# Patient Record
Sex: FEMALE | Race: OTHER | Employment: UNEMPLOYED | URBAN - METROPOLITAN AREA
[De-identification: names, ages, dates, MRNs, and addresses within clinical notes are randomized per-mention and may not be internally consistent; named-entity substitution may affect disease eponyms.]

---

## 2022-03-01 ENCOUNTER — HOSPITAL ENCOUNTER (EMERGENCY)
Age: 72
Discharge: HOME | End: 2022-03-01
Attending: EMERGENCY MEDICINE
Payer: MEDICARE

## 2022-03-01 ENCOUNTER — APPOINTMENT (OUTPATIENT)
Dept: GENERAL RADIOLOGY | Age: 72
End: 2022-03-01
Attending: EMERGENCY MEDICINE
Payer: MEDICARE

## 2022-03-01 VITALS
DIASTOLIC BLOOD PRESSURE: 90 MMHG | SYSTOLIC BLOOD PRESSURE: 160 MMHG | TEMPERATURE: 97.6 F | WEIGHT: 154 LBS | BODY MASS INDEX: 24.75 KG/M2 | HEART RATE: 84 BPM | RESPIRATION RATE: 10 BRPM | HEIGHT: 66 IN | OXYGEN SATURATION: 99 %

## 2022-03-01 DIAGNOSIS — I10 PRIMARY HYPERTENSION: Primary | ICD-10-CM

## 2022-03-01 LAB
ALBUMIN SERPL-MCNC: 3.9 G/DL (ref 3.4–5)
ALBUMIN/GLOB SERPL: 1.1 {RATIO} (ref 0.8–1.7)
ALP SERPL-CCNC: 111 U/L (ref 45–117)
ALT SERPL-CCNC: 32 U/L (ref 13–56)
ANION GAP SERPL CALC-SCNC: 6 MMOL/L (ref 3–18)
AST SERPL-CCNC: 26 U/L (ref 10–38)
ATRIAL RATE: 101 BPM
BASOPHILS # BLD: 0.1 K/UL (ref 0–0.1)
BASOPHILS NFR BLD: 1 % (ref 0–2)
BILIRUB SERPL-MCNC: 0.6 MG/DL (ref 0.2–1)
BUN SERPL-MCNC: 12 MG/DL (ref 7–18)
BUN/CREAT SERPL: 15 (ref 12–20)
CALCIUM SERPL-MCNC: 9.5 MG/DL (ref 8.5–10.1)
CALCULATED P AXIS, ECG09: 106 DEGREES
CALCULATED R AXIS, ECG10: 85 DEGREES
CALCULATED T AXIS, ECG11: -110 DEGREES
CHLORIDE SERPL-SCNC: 104 MMOL/L (ref 100–111)
CO2 SERPL-SCNC: 28 MMOL/L (ref 21–32)
CREAT SERPL-MCNC: 0.79 MG/DL (ref 0.6–1.3)
DIAGNOSIS, 93000: NORMAL
DIFFERENTIAL METHOD BLD: ABNORMAL
EOSINOPHIL # BLD: 0 K/UL (ref 0–0.4)
EOSINOPHIL NFR BLD: 0 % (ref 0–5)
ERYTHROCYTE [DISTWIDTH] IN BLOOD BY AUTOMATED COUNT: 12.8 % (ref 11.6–14.5)
GLOBULIN SER CALC-MCNC: 3.4 G/DL (ref 2–4)
GLUCOSE SERPL-MCNC: 201 MG/DL (ref 74–99)
HCT VFR BLD AUTO: 44 % (ref 35–45)
HGB BLD-MCNC: 14.5 G/DL (ref 12–16)
IMM GRANULOCYTES # BLD AUTO: 0.1 K/UL (ref 0–0.04)
IMM GRANULOCYTES NFR BLD AUTO: 1 % (ref 0–0.5)
LYMPHOCYTES # BLD: 2 K/UL (ref 0.9–3.6)
LYMPHOCYTES NFR BLD: 25 % (ref 21–52)
MCH RBC QN AUTO: 30 PG (ref 24–34)
MCHC RBC AUTO-ENTMCNC: 33 G/DL (ref 31–37)
MCV RBC AUTO: 91.1 FL (ref 78–100)
MONOCYTES # BLD: 0.6 K/UL (ref 0.05–1.2)
MONOCYTES NFR BLD: 7 % (ref 3–10)
NEUTS SEG # BLD: 5.2 K/UL (ref 1.8–8)
NEUTS SEG NFR BLD: 66 % (ref 40–73)
NRBC # BLD: 0 K/UL (ref 0–0.01)
NRBC BLD-RTO: 0 PER 100 WBC
P-R INTERVAL, ECG05: 126 MS
PLATELET # BLD AUTO: 182 K/UL (ref 135–420)
PMV BLD AUTO: 11.3 FL (ref 9.2–11.8)
POTASSIUM SERPL-SCNC: 4 MMOL/L (ref 3.5–5.5)
PROT SERPL-MCNC: 7.3 G/DL (ref 6.4–8.2)
Q-T INTERVAL, ECG07: 350 MS
QRS DURATION, ECG06: 86 MS
QTC CALCULATION (BEZET), ECG08: 453 MS
RBC # BLD AUTO: 4.83 M/UL (ref 4.2–5.3)
SODIUM SERPL-SCNC: 138 MMOL/L (ref 136–145)
TROPONIN-HIGH SENSITIVITY: 9 NG/L (ref 0–54)
VENTRICULAR RATE, ECG03: 101 BPM
WBC # BLD AUTO: 7.9 K/UL (ref 4.6–13.2)

## 2022-03-01 PROCEDURE — 71045 X-RAY EXAM CHEST 1 VIEW: CPT

## 2022-03-01 PROCEDURE — 85025 COMPLETE CBC W/AUTO DIFF WBC: CPT

## 2022-03-01 PROCEDURE — 99285 EMERGENCY DEPT VISIT HI MDM: CPT

## 2022-03-01 PROCEDURE — 84484 ASSAY OF TROPONIN QUANT: CPT

## 2022-03-01 PROCEDURE — 93005 ELECTROCARDIOGRAM TRACING: CPT

## 2022-03-01 PROCEDURE — 80053 COMPREHEN METABOLIC PANEL: CPT

## 2022-03-01 NOTE — ED TRIAGE NOTES
Patient does not speak english. Had to use  [facility]. Patient reports she does not feel good. Her b/p is high she feels weak and when she eats she feels nauseated. This has been going for about 3 months.   After triaged patient she states she is having chest pain

## 2022-03-02 NOTE — ED PROVIDER NOTES
EMERGENCY DEPARTMENT HISTORY AND PHYSICAL EXAM    Date: 3/1/2022  Patient Name: Robert Collazo    History of Presenting Illness     Chief Complaint   Patient presents with    Chest Pain    Lethargy    Hypertension         History Provided By: Patient and Patient's Daughter    Additional History (Context):   8:15 PM  Robert Collazo is a 73 y.o. female with PMHX of hypertension, previous heart surgery, hypertension who presents to the emergency department C/O not feeling well. She has a previous history of high blood pressure and heart disease and heart surgery who has been having symptoms including over the last 3 months. Her daughter states she recently moved here from [place] in order to have better care of her health problems. Patient details with me through Estonian and her daughter assisting in translating she has burning sensation to her chest.  She also has palpitations type of discomfort which she describes as an intense heartbeat but not a racing sensation. She denies any nausea vomiting diarrhea she has a dry cough with occasional bouts of thick productive sputum. There is no hemoptysis. She has no headache. There is no syncopal presyncopal episodes. She has no urinary symptoms. She has called and reasons for appointment with a primary care doctor however these are pushed out until April. Social History  No smoking drinking or drugs no significant caffeine use. Family History  Positive for high blood pressure negative for premature heart disease. Negative for malignancy or bleeding disorders. PCP: None        Past History     Past Medical History:  Past Medical History:   Diagnosis Date    Arthritis     Asthma     Endocrine disease     Hypertension        Past Surgical History:  Past Surgical History:   Procedure Laterality Date    HX CORONARY ARTERY BYPASS GRAFT         Family History:  No family history on file.     Social History:  Social History Tobacco Use    Smoking status: Never Smoker    Smokeless tobacco: Not on file   Substance Use Topics    Alcohol use: Never    Drug use: Never       Allergies:  No Known Allergies      Review of Systems   Review of Systems   Constitutional: Negative. HENT: Negative. Eyes: Negative. Respiratory: Negative. Cardiovascular: Positive for chest pain (Burning sensation in the chest and throat) and palpitations. Endocrine: Negative. Genitourinary: Negative. Musculoskeletal: Negative. Skin: Negative. Allergic/Immunologic: Negative. Neurological: Negative. Hematological: Negative. Psychiatric/Behavioral: The patient is nervous/anxious. All other systems reviewed and are negative. Physical Exam     Vitals:    03/01/22 1042 03/01/22 1130   BP: (!) 197/92 (!) 160/90   Pulse: (!) 102 84   Resp: 16 10   Temp: 97.6 °F (36.4 °C)    SpO2: 100% 99%   Weight: 69.9 kg (154 lb)    Height: 5' 6\" (1.676 m)      Physical Exam  Vitals and nursing note reviewed. Constitutional:       General: She is not in acute distress. Appearance: She is well-developed. She is not diaphoretic. HENT:      Head: Normocephalic and atraumatic. Eyes:      General: No scleral icterus. Extraocular Movements:      Right eye: Normal extraocular motion. Left eye: Normal extraocular motion. Conjunctiva/sclera: Conjunctivae normal.      Pupils: Pupils are equal, round, and reactive to light. Neck:      Trachea: No tracheal deviation. Cardiovascular:      Rate and Rhythm: Normal rate and regular rhythm. Heart sounds: Normal heart sounds. Pulmonary:      Effort: Pulmonary effort is normal. No respiratory distress. Breath sounds: Normal breath sounds. No stridor. Abdominal:      General: Bowel sounds are normal. There is no distension. Palpations: Abdomen is soft. Tenderness: There is no abdominal tenderness. There is no rebound.    Musculoskeletal:         General: No tenderness. Normal range of motion. Cervical back: Normal range of motion and neck supple. Comments: Grossly unremarkable without abnormalities   Skin:     General: Skin is warm and dry. Capillary Refill: Capillary refill takes less than 2 seconds. Findings: No erythema or rash. Neurological:      Mental Status: She is alert and oriented to person, place, and time. GCS: GCS eye subscore is 4. GCS verbal subscore is 5. GCS motor subscore is 6. Cranial Nerves: No cranial nerve deficit. Motor: Motor function is intact. No weakness. Coordination: Coordination normal.   Psychiatric:         Mood and Affect: Mood normal.         Behavior: Behavior normal.         Thought Content:  Thought content normal.         Judgment: Judgment normal.       Diagnostic Study Results     Labs -  Recent Results (from the past 24 hour(s))   EKG, 12 LEAD, INITIAL    Collection Time: 03/01/22 10:46 AM   Result Value Ref Range    Ventricular Rate 101 BPM    Atrial Rate 101 BPM    P-R Interval 126 ms    QRS Duration 86 ms    Q-T Interval 350 ms    QTC Calculation (Bezet) 453 ms    Calculated P Axis 106 degrees    Calculated R Axis 85 degrees    Calculated T Axis -110 degrees    Diagnosis       Sinus tachycardia  ST & T wave abnormality, consider inferior ischemia  ST & T wave abnormality, consider anterolateral ischemia  Abnormal ECG  No previous ECGs available     CBC WITH AUTOMATED DIFF    Collection Time: 03/01/22 11:00 AM   Result Value Ref Range    WBC 7.9 4.6 - 13.2 K/uL    RBC 4.83 4.20 - 5.30 M/uL    HGB 14.5 12.0 - 16.0 g/dL    HCT 44.0 35.0 - 45.0 %    MCV 91.1 78.0 - 100.0 FL    MCH 30.0 24.0 - 34.0 PG    MCHC 33.0 31.0 - 37.0 g/dL    RDW 12.8 11.6 - 14.5 %    PLATELET 589 197 - 441 K/uL    MPV 11.3 9.2 - 11.8 FL    NRBC 0.0 0  WBC    ABSOLUTE NRBC 0.00 0.00 - 0.01 K/uL    NEUTROPHILS 66 40 - 73 %    LYMPHOCYTES 25 21 - 52 %    MONOCYTES 7 3 - 10 %    EOSINOPHILS 0 0 - 5 % BASOPHILS 1 0 - 2 %    IMMATURE GRANULOCYTES 1 (H) 0.0 - 0.5 %    ABS. NEUTROPHILS 5.2 1.8 - 8.0 K/UL    ABS. LYMPHOCYTES 2.0 0.9 - 3.6 K/UL    ABS. MONOCYTES 0.6 0.05 - 1.2 K/UL    ABS. EOSINOPHILS 0.0 0.0 - 0.4 K/UL    ABS. BASOPHILS 0.1 0.0 - 0.1 K/UL    ABS. IMM. GRANS. 0.1 (H) 0.00 - 0.04 K/UL    DF AUTOMATED     METABOLIC PANEL, COMPREHENSIVE    Collection Time: 03/01/22 11:00 AM   Result Value Ref Range    Sodium 138 136 - 145 mmol/L    Potassium 4.0 3.5 - 5.5 mmol/L    Chloride 104 100 - 111 mmol/L    CO2 28 21 - 32 mmol/L    Anion gap 6 3.0 - 18 mmol/L    Glucose 201 (H) 74 - 99 mg/dL    BUN 12 7.0 - 18 MG/DL    Creatinine 0.79 0.6 - 1.3 MG/DL    BUN/Creatinine ratio 15 12 - 20      GFR est AA >60 >60 ml/min/1.73m2    GFR est non-AA >60 >60 ml/min/1.73m2    Calcium 9.5 8.5 - 10.1 MG/DL    Bilirubin, total 0.6 0.2 - 1.0 MG/DL    ALT (SGPT) 32 13 - 56 U/L    AST (SGOT) 26 10 - 38 U/L    Alk. phosphatase 111 45 - 117 U/L    Protein, total 7.3 6.4 - 8.2 g/dL    Albumin 3.9 3.4 - 5.0 g/dL    Globulin 3.4 2.0 - 4.0 g/dL    A-G Ratio 1.1 0.8 - 1.7     TROPONIN-HIGH SENSITIVITY    Collection Time: 03/01/22 11:00 AM   Result Value Ref Range    Troponin-High Sensitivity 9 0 - 54 ng/L        Radiologic Studies -   XR CHEST PORT   Final Result      No acute cardiopulmonary process. CT Results  (Last 48 hours)    None        CXR Results  (Last 48 hours)               03/01/22 1107  XR CHEST PORT Final result    Impression:      No acute cardiopulmonary process. Narrative:  EXAM: One view chest x-ray       CLINICAL INDICATION/HISTORY: Chest pain. COMPARISON: No prior relevant study available for comparison. TECHNIQUE: Single AP view of the chest was obtained.       _______________       FINDINGS:       HEART, VESSELS, MEDIASTINUM: Heart size is normal. No vascular congestion. LUNGS, PLEURAL SPACES: The lungs are clear. No effusion or pneumothorax.        BONY THORAX, SOFT TISSUES: Unremarkable.       _______________                 Medications given in the ED-  Medications - No data to display      Medical Decision Making   I am the first provider for this patient. I reviewed the vital signs, available nursing notes, past medical history, past surgical history, family history and social history. Vital Signs-Reviewed the patient's vital signs. Pulse Oximetry Analysis - 99% on room air    Cardiac Monitor:  Rate: 86 bpm  Rhythm: Sinus rhythm    EKG interpretation: (Preliminary)  10:46 AM   Borderline sinus tachycardia, rate 101, nonspecific ST changes  EKG read by Alejandra Vivas MD      Records Reviewed: NURSING NOTES AND PREVIOUS MEDICAL RECORDS    Provider Notes (Medical Decision Making):   Patient describes palpitations and burning sensation in her throat taking place every 3 weeks. She is a high-risk patient and we really have no accurate history of her medications. They can acknowledge that she takes losartan thyroid medications but that is all. Differentials include hypertensive crisis urgency reflux ACS pulmonary embolism metabolic disorders muscle aches and spasms generalized fatigue. Possible but unlikely pneumonia or CHF. If her results are unremarkable she can be discharged follow-up seen with primary care doctor. Procedures:  Procedures    ED Course:   10:25 AM: Initial assessment performed. The patients presenting problems have been discussed, and they are in agreement with the care plan formulated and outlined with them. I have encouraged them to ask questions as they arise throughout their visit. Diagnosis and Disposition       DISCHARGE NOTE:    Fredrick Garcia's  results have been reviewed with her. She has been counseled regarding her diagnosis, treatment, and plan. She verbally conveys understanding and agreement of the signs, symptoms, diagnosis, treatment and prognosis and additionally agrees to follow up as discussed.   She also agrees with the care-plan and conveys that all of her questions have been answered. I have also provided discharge instructions for her that include: educational information regarding their diagnosis and treatment, and list of reasons why they would want to return to the ED prior to their follow-up appointment, should her condition change. She has been provided with education for proper emergency department utilization. CLINICAL IMPRESSION:    1. Primary hypertension        PLAN:  1. D/C Home  2. There are no discharge medications for this patient. 3.   Follow-up Information     Follow up With Specialties Details Why Contact Info    [facility] EMERGENCY DEPT Emergency Medicine  As needed [address] [place] [id number]  [address] [facility]  [phone]        _______________________________    This note was partially transcribed via voice recognition software. Although efforts have been made to catch any discrepancies, it may contain sound alike words, grammatical errors, or nonsensical words.

## 2022-05-06 ENCOUNTER — HOSPITAL ENCOUNTER (OUTPATIENT)
Dept: PREADMISSION TESTING | Age: 72
Discharge: HOME | End: 2022-05-06

## 2022-05-06 VITALS — HEIGHT: 66 IN | BODY MASS INDEX: 25.39 KG/M2 | WEIGHT: 158 LBS

## 2022-05-06 RX ORDER — ROSUVASTATIN CALCIUM 10 MG/1
10 TABLET, COATED ORAL DAILY
COMMUNITY

## 2022-05-06 RX ORDER — OFLOXACIN 3 MG/ML
1 SOLUTION/ DROPS OPHTHALMIC
Status: CANCELLED | OUTPATIENT
Start: 2022-05-10

## 2022-05-06 RX ORDER — LEVOTHYROXINE SODIUM 75 UG/1
75 TABLET ORAL
COMMUNITY

## 2022-05-06 RX ORDER — PHENYLEPHRINE HYDROCHLORIDE 25 MG/ML
1 SOLUTION/ DROPS OPHTHALMIC
Status: CANCELLED | OUTPATIENT
Start: 2022-05-10 | End: 2022-05-10

## 2022-05-06 RX ORDER — SODIUM CHLORIDE, SODIUM LACTATE, POTASSIUM CHLORIDE, CALCIUM CHLORIDE 600; 310; 30; 20 MG/100ML; MG/100ML; MG/100ML; MG/100ML
75 INJECTION, SOLUTION INTRAVENOUS CONTINUOUS
Status: CANCELLED | OUTPATIENT
Start: 2022-05-10

## 2022-05-06 RX ORDER — KETOROLAC TROMETHAMINE 5 MG/ML
1 SOLUTION OPHTHALMIC
Status: CANCELLED | OUTPATIENT
Start: 2022-05-10

## 2022-05-06 RX ORDER — DULAGLUTIDE 1.5 MG/.5ML
1.5 INJECTION, SOLUTION SUBCUTANEOUS
COMMUNITY

## 2022-05-06 RX ORDER — TROPICAMIDE 10 MG/ML
1 SOLUTION/ DROPS OPHTHALMIC
Status: CANCELLED | OUTPATIENT
Start: 2022-05-10 | End: 2022-05-10

## 2022-05-06 NOTE — PERIOP NOTES
Patient speaks only [place] She was interviewed with the help of an , Artie Albarran, [id number]. Patient advised to wear mask when entering any of the buildings. Being fully vaccinated, they will no longer need to be tested or quarantine prior to procedure. Patient does not meet criteria for special pop. No hx of sleep apnea or previous screening. Denies hx of MH. PCP is aware of surgery. CHG skin care kit given and process reviewed. Not participating in any research study or clinical trials. Patient instructed when coming in for surgery, do not use any lotions, creams or deodorant. Also to remove all jewelry, hairpins, make-up and nail polish. Instructed to wear something loose fitting and comfortable, easy to take off, easy to put on. Instructed to bring bag with glasses and all eye gtts with her dos. No code status ordered.

## 2022-05-10 ENCOUNTER — ANESTHESIA EVENT (OUTPATIENT)
Dept: SURGERY | Age: 72
End: 2022-05-10
Payer: MEDICARE

## 2022-05-10 ENCOUNTER — ANESTHESIA (OUTPATIENT)
Dept: SURGERY | Age: 72
End: 2022-05-10
Payer: MEDICARE

## 2022-05-10 ENCOUNTER — HOSPITAL ENCOUNTER (OUTPATIENT)
Age: 72
Setting detail: OUTPATIENT SURGERY
Discharge: HOME | End: 2022-05-10
Attending: OPHTHALMOLOGY | Admitting: OPHTHALMOLOGY
Payer: MEDICARE

## 2022-05-10 VITALS
OXYGEN SATURATION: 99 % | HEIGHT: 66 IN | RESPIRATION RATE: 16 BRPM | DIASTOLIC BLOOD PRESSURE: 68 MMHG | TEMPERATURE: 97 F | WEIGHT: 157.6 LBS | SYSTOLIC BLOOD PRESSURE: 140 MMHG | BODY MASS INDEX: 25.33 KG/M2 | HEART RATE: 70 BPM

## 2022-05-10 PROBLEM — H53.8 BLURRED VISION: Status: ACTIVE | Noted: 2022-05-10

## 2022-05-10 PROBLEM — H53.8 BLURRED VISION: Status: RESOLVED | Noted: 2022-05-10 | Resolved: 2022-05-10

## 2022-05-10 LAB — GLUCOSE BLD STRIP.AUTO-MCNC: 112 MG/DL (ref 70–110)

## 2022-05-10 PROCEDURE — 74011000250 HC RX REV CODE- 250: Performed by: OPHTHALMOLOGY

## 2022-05-10 PROCEDURE — 74011250636 HC RX REV CODE- 250/636: Performed by: OPHTHALMOLOGY

## 2022-05-10 PROCEDURE — 77030013340: Performed by: OPHTHALMOLOGY

## 2022-05-10 PROCEDURE — 77030013389: Performed by: OPHTHALMOLOGY

## 2022-05-10 PROCEDURE — 76060000032 HC ANESTHESIA 0.5 TO 1 HR: Performed by: OPHTHALMOLOGY

## 2022-05-10 PROCEDURE — 74011250636 HC RX REV CODE- 250/636: Performed by: NURSE ANESTHETIST, CERTIFIED REGISTERED

## 2022-05-10 PROCEDURE — 76210000021 HC REC RM PH II 0.5 TO 1 HR: Performed by: OPHTHALMOLOGY

## 2022-05-10 PROCEDURE — 76010000138 HC OR TIME 0.5 TO 1 HR: Performed by: OPHTHALMOLOGY

## 2022-05-10 PROCEDURE — 77030006704 HC BLD OPHTH SLT ALCN -B: Performed by: OPHTHALMOLOGY

## 2022-05-10 PROCEDURE — 77030020782 HC GWN BAIR PAWS FLX 3M -B: Performed by: OPHTHALMOLOGY

## 2022-05-10 PROCEDURE — 77030018837 HC SOL IRR OPTH ALCN -A: Performed by: OPHTHALMOLOGY

## 2022-05-10 PROCEDURE — 82962 GLUCOSE BLOOD TEST: CPT

## 2022-05-10 PROCEDURE — 2709999900 HC NON-CHARGEABLE SUPPLY: Performed by: OPHTHALMOLOGY

## 2022-05-10 PROCEDURE — V2632 POST CHMBR INTRAOCULAR LENS: HCPCS | Performed by: OPHTHALMOLOGY

## 2022-05-10 DEVICE — ACRYSOF(R) IQ ASPHERIC NATURAL IOL, SINGLE-PIECE ACRYLIC FOLDABLE PCL, UV WITH BLUE LIGHTFILTER, 13.0MM LENGTH, 6.0MM ANTERIORASYMMETRIC BICONVEX OPTIC, PLANAR HAPTICS.
Type: IMPLANTABLE DEVICE | Site: EYE | Status: FUNCTIONAL
Brand: ACRYSOF®

## 2022-05-10 RX ORDER — EPINEPHRINE 1 MG/ML
INJECTION, SOLUTION, CONCENTRATE INTRAVENOUS AS NEEDED
Status: DISCONTINUED | OUTPATIENT
Start: 2022-05-10 | End: 2022-05-10 | Stop reason: HOSPADM

## 2022-05-10 RX ORDER — SODIUM CHLORIDE, SODIUM LACTATE, POTASSIUM CHLORIDE, CALCIUM CHLORIDE 600; 310; 30; 20 MG/100ML; MG/100ML; MG/100ML; MG/100ML
75 INJECTION, SOLUTION INTRAVENOUS CONTINUOUS
Status: DISCONTINUED | OUTPATIENT
Start: 2022-05-10 | End: 2022-05-10 | Stop reason: HOSPADM

## 2022-05-10 RX ORDER — OFLOXACIN 3 MG/ML
1 SOLUTION/ DROPS OPHTHALMIC
Status: DISCONTINUED | OUTPATIENT
Start: 2022-05-10 | End: 2022-05-10 | Stop reason: HOSPADM

## 2022-05-10 RX ORDER — PHENYLEPHRINE HYDROCHLORIDE 25 MG/ML
1 SOLUTION/ DROPS OPHTHALMIC
Status: COMPLETED | OUTPATIENT
Start: 2022-05-10 | End: 2022-05-10

## 2022-05-10 RX ORDER — TROPICAMIDE 10 MG/ML
1 SOLUTION/ DROPS OPHTHALMIC
Status: COMPLETED | OUTPATIENT
Start: 2022-05-10 | End: 2022-05-10

## 2022-05-10 RX ORDER — MIDAZOLAM HYDROCHLORIDE 1 MG/ML
INJECTION, SOLUTION INTRAMUSCULAR; INTRAVENOUS AS NEEDED
Status: DISCONTINUED | OUTPATIENT
Start: 2022-05-10 | End: 2022-05-10 | Stop reason: HOSPADM

## 2022-05-10 RX ORDER — FENTANYL CITRATE 50 UG/ML
INJECTION, SOLUTION INTRAMUSCULAR; INTRAVENOUS AS NEEDED
Status: DISCONTINUED | OUTPATIENT
Start: 2022-05-10 | End: 2022-05-10 | Stop reason: HOSPADM

## 2022-05-10 RX ORDER — KETOROLAC TROMETHAMINE 5 MG/ML
1 SOLUTION OPHTHALMIC
Status: COMPLETED | OUTPATIENT
Start: 2022-05-10 | End: 2022-05-10

## 2022-05-10 RX ADMIN — TROPICAMIDE 1 DROP: 10 SOLUTION/ DROPS OPHTHALMIC at 08:05

## 2022-05-10 RX ADMIN — FENTANYL CITRATE 25 MCG: 50 INJECTION, SOLUTION INTRAMUSCULAR; INTRAVENOUS at 09:21

## 2022-05-10 RX ADMIN — PHENYLEPHRINE HYDROCHLORIDE 1 DROP: 2.5 SOLUTION/ DROPS OPHTHALMIC at 08:16

## 2022-05-10 RX ADMIN — TROPICAMIDE 1 DROP: 10 SOLUTION/ DROPS OPHTHALMIC at 08:16

## 2022-05-10 RX ADMIN — KETOROLAC TROMETHAMINE 1 DROP: 0.5 SOLUTION OPHTHALMIC at 08:01

## 2022-05-10 RX ADMIN — LIDOCAINE HYDROCHLORIDE 2 DROP: 35 GEL OPHTHALMIC at 08:16

## 2022-05-10 RX ADMIN — MIDAZOLAM 1 MG: 1 INJECTION INTRAMUSCULAR; INTRAVENOUS at 09:11

## 2022-05-10 RX ADMIN — PHENYLEPHRINE HYDROCHLORIDE 1 DROP: 2.5 SOLUTION/ DROPS OPHTHALMIC at 08:05

## 2022-05-10 RX ADMIN — FENTANYL CITRATE 25 MCG: 50 INJECTION, SOLUTION INTRAMUSCULAR; INTRAVENOUS at 09:28

## 2022-05-10 RX ADMIN — MIDAZOLAM 1 MG: 1 INJECTION INTRAMUSCULAR; INTRAVENOUS at 09:05

## 2022-05-10 RX ADMIN — KETOROLAC TROMETHAMINE 1 DROP: 0.5 SOLUTION OPHTHALMIC at 08:05

## 2022-05-10 RX ADMIN — TROPICAMIDE 1 DROP: 10 SOLUTION/ DROPS OPHTHALMIC at 08:11

## 2022-05-10 RX ADMIN — SODIUM CHLORIDE, SODIUM LACTATE, POTASSIUM CHLORIDE, AND CALCIUM CHLORIDE 75 ML/HR: 600; 310; 30; 20 INJECTION, SOLUTION INTRAVENOUS at 08:14

## 2022-05-10 RX ADMIN — FENTANYL CITRATE 25 MCG: 50 INJECTION, SOLUTION INTRAMUSCULAR; INTRAVENOUS at 09:15

## 2022-05-10 RX ADMIN — FENTANYL CITRATE 25 MCG: 50 INJECTION, SOLUTION INTRAMUSCULAR; INTRAVENOUS at 09:09

## 2022-05-10 RX ADMIN — PHENYLEPHRINE HYDROCHLORIDE 1 DROP: 2.5 SOLUTION/ DROPS OPHTHALMIC at 08:01

## 2022-05-10 RX ADMIN — TROPICAMIDE 1 DROP: 10 SOLUTION/ DROPS OPHTHALMIC at 08:01

## 2022-05-10 RX ADMIN — OFLOXACIN 1 DROP: 3 SOLUTION OPHTHALMIC at 08:00

## 2022-05-10 RX ADMIN — KETOROLAC TROMETHAMINE 1 DROP: 0.5 SOLUTION OPHTHALMIC at 08:16

## 2022-05-10 RX ADMIN — KETOROLAC TROMETHAMINE 1 DROP: 0.5 SOLUTION OPHTHALMIC at 08:11

## 2022-05-10 RX ADMIN — PHENYLEPHRINE HYDROCHLORIDE 1 DROP: 2.5 SOLUTION/ DROPS OPHTHALMIC at 08:11

## 2022-05-10 NOTE — ANESTHESIA POSTPROCEDURE EVALUATION
Procedure(s):  CATARACT EXTRACTION WITH INTRA OCULAR LENS IMPLANT- RIGHT EYE. No value filed. <BSHSIANPOST>    INITIAL Post-op Vital signs:   Vitals Value Taken Time   /78 05/10/22 1015   Temp 36.1 °C (97 °F) 05/10/22 1000   Pulse 70 05/10/22 1015   Resp 17 05/10/22 1015   SpO2 95 % 05/10/22 1015       Post-Anesthesia Evaluation and Assessment    Cardiovascular Function/Vital Signs  Visit Vitals  /78 (BP 1 Location: Left upper arm, BP Patient Position: At rest)   Pulse 70   Temp 36.1 °C (97 °F)   Resp 17   Ht 5' 6\" (1.676 m)   Wt 71.5 kg (157 lb 9.6 oz)   SpO2 95%   BMI 25.44 kg/m²       Patient is status post Procedure(s):  CATARACT EXTRACTION WITH INTRA OCULAR LENS IMPLANT- RIGHT EYE. Nausea/Vomiting: Controlled. Postoperative hydration reviewed and adequate. Pain:  Pain Scale 1: Numeric (0 - 10) (05/10/22 1015)  Pain Intensity 1: 0 (05/10/22 1015)   Managed. Neurological Status: At baseline. Mental Status and Level of Consciousness: Baseline and appropriate for discharge. Pulmonary Status:   O2 Device: None (Room air) (05/10/22 1015)   Adequate oxygenation and airway patent. Complications related to anesthesia: None    Post-anesthesia assessment completed. No concerns. Patient has met all discharge requirements.     Signed By: Alok Hi CRNA    May 10, 2022

## 2022-05-10 NOTE — PERIOP NOTES
Face to face Discharged  instruction given to daughter/pt  and agreed with the plan. Discharged includes diet, activity limitations , medication to continue - as directed by Doctor Tom Rahman  and f/u appointment- to bring all her eyedrops tomorrow. D/c to home in stable condition with care of family.

## 2022-05-10 NOTE — INTERVAL H&P NOTE
Update History & Physical    The Patient's History and Physical of May 10,   2022 was reviewed with the patient and I examined the patient. There was no change. The surgical site was confirmed by the patient and me. Plan:  The risk, benefits, expected outcome, and alternative to the recommended procedure have been discussed with the patient. Patient understands and wants to proceed with the procedure.     Electronically signed by Swapnil Ibanez MD on 5/10/2022 at 8:16 AM

## 2022-05-10 NOTE — DISCHARGE INSTRUCTIONS
DISCHARGE SUMMARY from Nurse    PATIENT INSTRUCTIONS:    After general anesthesia or intravenous sedation, for 24 hours or while taking prescription Narcotics:  · Limit your activities  · Do not drive and operate hazardous machinery  · Do not make important personal or business decisions  · Do  not drink alcoholic beverages  · If you have not urinated within 8 hours after discharge, please contact your surgeon on call. Report the following to your surgeon:  · Excessive pain, swelling, redness or odor of or around the surgical area  · Temperature over 100.5  · Nausea and vomiting lasting longer than 4 hours or if unable to take medications  · Any signs of decreased circulation or nerve impairment to extremity: change in color, persistent  numbness, tingling, coldness or increase pain  · Any questions    What to do at Home:  Recommended activity: as above ,    If you experience any of the following symptoms as above , please follow up with Doctor Edward To. *  Please give a list of your current medications to your Primary Care Provider. *  Please update this list whenever your medications are discontinued, doses are      changed, or new medications (including over-the-counter products) are added. *  Please carry medication information at all times in case of emergency situations. These are general instructions for a healthy lifestyle:    No smoking/ No tobacco products/ Avoid exposure to second hand smoke  Surgeon General's Warning:  Quitting smoking now greatly reduces serious risk to your health.     Obesity, smoking, and sedentary lifestyle greatly increases your risk for illness    A healthy diet, regular physical exercise & weight monitoring are important for maintaining a healthy lifestyle    You may be retaining fluid if you have a history of heart failure or if you experience any of the following symptoms:  Weight gain of 3 pounds or more overnight or 5 pounds in a week, increased swelling in our hands or feet or shortness of breath while lying flat in bed. Please call your doctor as soon as you notice any of these symptoms; do not wait until your next office visit. The discharge information has been reviewed with the patient and caregiver. The patient and caregiver verbalized understanding. Discharge medications reviewed with the patient and caregiver and appropriate educational materials and side effects teaching were provided. ___________________________________________________________________________________________________________________________________     [place] cataratas: Qué esperar en el hogar  Cataract Surgery: What to Expect at Home  Malone recuperación     [address] [place] [address], el dariel no le dolerá. Sin embargo, podría sentir que le pica, que está pegajoso o que [place]. También es posible que el dariel lagrimee más que lo habitual.  La mayoría de las personas alyssa mejor entre 1 y 1 días después de la [facility]. Nhan podrían pasar entre 3 y 8 semanas para que alcance todos los beneficios de la [facility] y nina lo más claramente posible. Malone médico podría enviarlo a casa con lor venda, un parche o un protector transparente en el dariel. [place] evitará que se frote el dariel. Malone médico también le dará gotas para los ojos (colirios) para ayudar a que el dariel sane. Úselas exactamente jean se le indicó. Puede leer o mirar televisión inmediatamente, nhan es posible que muchas cosas se vean borrosas. La mayoría de las personas pueden volver a malone trabajo o rutina habitual en 1 a 3 días. Después de que sane el dariel, posiblemente deba seguir usando anteojos, en especial para leer. Esta hoja de [employer] idea general del tiempo que le llevará recuperarse. Sin embargo, cada persona se recupera a un ritmo diferente. Siga los pasos que se mencionan a continuación para recuperarse lo más rápido posible. ¿Cómo puede cuidarse en el hogar? Actividad    · Descanse cuando se sienta fatigado.  Dormir lo suficiente le ayudará a recuperarse.     · Javier algunos alex, podría tener dificultad para determinar distancias. Muévase despacio, y tenga cuidado al subir y bajar escaleras y servir líquidos calientes. Pida ayuda si la necesita.     · Pregúntele a malone médico cuándo puede conducir.     · Use el vendaje, parche o protector por el tiempo que malone médico recomiende. Posiblemente solo deba usarlo mientras duerme.     · Puede ducharse o lavarse el ayla al día siguiente de la hospitals. No deje que [employer], Jayda, champú, fijador (laca) ni loción para afeitarse en el dariel, especialmente javier la primera semana.     · No se restriegue el dariel ni ejerza presión sobre él al menos javier 1 semana.     · No use maquillaje para los ojos por 1 a 2 semanas. También debería evitar el uso de cremas o lociones faciales.     · No se tiña el ayla ni se james [employer] javier 10 días después de la [facility].     · Javire 2 semanas o hasta que malone médico se lo permita, no se incline ni james ninguna actividad vigorosa, jean montar en bicicleta, trotar, levantar pesas o hacer ejercicio aeróbico.     · Javier 1 a 2 semanas, no practique natación, no se meta en un \"jacuzzi\", no james actividades de jardinería ni sacuda el polvo.     · Javier al menos 1 año después de la cirugía, use anteojos de sol los días soleados. Medicamentos    · Malone médico le dirá si puede volver a megan mitchell medicamentos y cuándo puede volver a hacerlo. También le dará indicaciones sobre cualquier medicamento nuevo que deba megan usted.     · Si bouchra aspirina o cualquier otro medicamento que previene los coágulos de [place]', pregúntele a malone médico si debería volver a tomarlo y en qué momento. Asegúrese de entender exactamente lo que malone médico quiere que james.     · Siga las indicaciones de malone médico sobre cuándo usar gotas para los ojos. Siempre lávese las nikki antes de ponerse gotas.  Para aplicarse las gotas en los ojos:  ? Incline la [facility] atrás y con un dedo baje el párpado inferior. ? Deje caer las gotas o ponga el medicamento dentro del párpado inferior. ? Cierre el dariel javier 30 a 61 segundos para permitir que las gotas o la pomada se esparzan. ? No permita que la punta de la pomada o del gotero toque mitchell pestañas ni otra superficie.     · Siga las instrucciones de malone médico para megan analgésicos (medicamentos para el dolor). La atención de seguimiento es lor parte clave de malone tratamiento y seguridad. Asegúrese de hacer y acudir a todas las citas, y llame a malone médico si está teniendo problemas. También es lor buena idea saber los resultados de mitchell exámenes y mantener lor lista de los medicamentos que bouchra. ¿Cuándo debe pedir ayuda? Llame al 911 en cualquier momento que considere que necesita atención de [place]. Por ejemplo, llame si:    · Se desmayó (perdió el conocimiento).     · Tiene lor pérdida repentina de la vista.     · Tiene dolor repentino en el pecho, le falta el aire, o tose [place]. Llame a malone médico ahora mismo o busque atención médica inmediata si:    · Tiene señales de lor infección en el dariel, tales jean:  ? Pus o lor secreción espesa que sale del dariel. ? Enrojecimiento o hinchazón alrededor del dariel. ? [place].     · Tiene nuevo o peor dolor en el dariel.     · Tiene cambios en la vista.     · Tiene síntomas de un coágulo de nadya en la pierna (conocido jean trombosis venosa profunda), tales jean:  ? Dolor en la pantorrilla, el muslo, la meryl o detrás de la rodilla. ? Enrojecimiento e hinchazón en la pierna o la meryl. Preste especial atención a los cambios en malone des y asegúrese de comunicarse con malone médico si:    · No mejora jean se esperaba. ¿Dónde puede encontrar más información en inglés? Vaya a http://www.gray.com/  Escriba R255 en la búsqueda para aprender más acerca de \"Cirugía de cataratas: Qué esperar en el hogar. \"  Revisado: 24 enero, 2022               Versión del contenido: 13.2  © 9599-8134 [employer]wise, [employer]. Las instrucciones de cuidado fueron adaptadas bajo licencia por [employer] (which disclaims liability or warranty for this information). Si usted tiene [place] afección médica o sobre estas instrucciones, siempre pregunte a malone profesional de des. Healthwise, Incorporated niega toda garantía o responsabilidad por malone uso de esta información.     Patient armband removed and shredded

## 2022-05-10 NOTE — ANESTHESIA PREPROCEDURE EVALUATION
Relevant Problems   No relevant active problems       Anesthetic History   No history of anesthetic complications            Review of Systems / Medical History  Patient summary reviewed, nursing notes reviewed and pertinent labs reviewed    Pulmonary            Asthma : well controlled  Pertinent negatives: No sleep apnea and smoker     Neuro/Psych   Within defined limits           Cardiovascular    Hypertension              Exercise tolerance: >4 METS  Comments: H/o VSD repair at 26 yo, no problems since   GI/Hepatic/Renal             Pertinent negatives: No hiatal hernia and GERD   Endo/Other    Diabetes: well controlled, type 2  Hypothyroidism: well controlled  Arthritis     Other Findings              Physical Exam    Airway  Mallampati: II  TM Distance: > 6 cm  Neck ROM: normal range of motion   Mouth opening: Normal     Cardiovascular    Rhythm: regular  Rate: normal         Dental      Comments: Missing 4 or 5 teeth   Pulmonary  Breath sounds clear to auscultation               Abdominal  GI exam deferred       Other Findings            Anesthetic Plan    ASA: 2  Anesthesia type: MAC          Induction: Intravenous  Anesthetic plan and risks discussed with: Patient

## 2022-05-10 NOTE — PERIOP NOTES
Reviewed PTA medication list with patient/caregiver and patient/caregiver denies any additional medications. Patient admits to having a responsible adult care for them at home for at least 24 hours after surgery. Patient encouraged to use gown warming system and informed that using said warming gown to regulate body temperature prior to a procedure has been shown to help reduce the risks of blood clots and infection. Patient's pharmacy of choice verified and documented in PTA medication section. Dual skin assessment & fall risk band verification completed with Naima Carrasco.

## 2022-05-24 NOTE — OP NOTES
Cataract Operative Note      Patient: Johnny Estrada               Sex: female          DOA: 5/10/2022         YOB: 1950      Age:  73 y.o.        LOS:  LOS: 0 days     Preoperative Diagnosis: Cataract right eye    Postoperative Diagnosis:  Cataract  right eye  Surgeon: Ava Calero MD, M.D. Anesthesia:  Topical anesthesia  Procedure:  Phacoemulsification of posterior chamber for intraocular lens implantation right    Fluids:  0    Procedure in Detail: The operative eye was prepped and draped in the usual fashion. A lid speculum was placed in the operative eye. A clear cornea approach was utilized. A paracentesis incision(s) was constructed with a 1 mm slit knife. One percent preservative-free lidocaine followed by viscoelastic was instilled into the anterior chamber. A clear corneal incision was made with a slit knife. A continuous curvilinear capsulorrhexis was constructed followed by hydrodissection. A phacoemulsification tip was placed into the eye, and the lens nucleus was emulsified. The irrigation/aspiration device was then used to remove any remaining cortical material.  Polishing of the capsule was performed as needed. The intraocular lens was then placed into the capsular bag after it was re-inflated with viscoelastic. The remaining viscoelastic was then removed using the irrigation/aspiration device. BSS on a cannula was then used to hydrate the wound edges. At the end of the procedure the wound was found to be watertight, the anterior chamber was deep and the pupil round. No blood loss during surgery. An antibiotic and anti-inflammatroy was placed into the operative eye. The lid speculum was removed. Protective sunglasses were then placed onto the patient. The patient was taken to the [address] [place] [address] Unit (PACU) in good condition having tolerated the procedure well. Estimated Blood Loss: 0                 Implants:   Implant Name Type Inv.  Item Serial No.  Lot No. LRB No. Used Action   LENS INTOCU 6.0 TO 30.0 DIOPT 118.7 A CONSTANT 0DEG ANG - [id number] Intraocular Lens LENS INTOCU 6.0 TO 30.0 DIOPT 118.7 A CONSTANT 0DEG ANG [id number] [employer]  Right 1 Implanted       Specimens: * No specimens in log *            Complications:  None           Clarisa Umaña MD , M.D.  [unfilled]  9:26 AM

## 2022-07-13 ENCOUNTER — HOSPITAL ENCOUNTER (OUTPATIENT)
Dept: PREADMISSION TESTING | Age: 72
Discharge: HOME | End: 2022-07-13

## 2022-07-13 VITALS — WEIGHT: 157 LBS | HEIGHT: 65 IN | BODY MASS INDEX: 26.16 KG/M2

## 2022-07-13 RX ORDER — PHENYLEPHRINE HYDROCHLORIDE 25 MG/ML
1 SOLUTION/ DROPS OPHTHALMIC
Status: CANCELLED | OUTPATIENT
Start: 2022-07-13 | End: 2022-07-13

## 2022-07-13 RX ORDER — TROPICAMIDE 10 MG/ML
1 SOLUTION/ DROPS OPHTHALMIC
Status: CANCELLED | OUTPATIENT
Start: 2022-07-26 | End: 2022-07-26

## 2022-07-13 RX ORDER — KETOROLAC TROMETHAMINE 5 MG/ML
1 SOLUTION OPHTHALMIC
Status: CANCELLED | OUTPATIENT
Start: 2022-07-26

## 2022-07-26 ENCOUNTER — HOSPITAL ENCOUNTER (OUTPATIENT)
Age: 72
Setting detail: OUTPATIENT SURGERY
Discharge: HOME | End: 2022-07-26
Attending: OPHTHALMOLOGY | Admitting: OPHTHALMOLOGY
Payer: MEDICARE

## 2022-07-26 ENCOUNTER — ANESTHESIA EVENT (OUTPATIENT)
Dept: SURGERY | Age: 72
End: 2022-07-26
Payer: MEDICARE

## 2022-07-26 ENCOUNTER — ANESTHESIA (OUTPATIENT)
Dept: SURGERY | Age: 72
End: 2022-07-26
Payer: MEDICARE

## 2022-07-26 VITALS
HEART RATE: 69 BPM | WEIGHT: 154.6 LBS | RESPIRATION RATE: 16 BRPM | TEMPERATURE: 97.9 F | BODY MASS INDEX: 24.85 KG/M2 | SYSTOLIC BLOOD PRESSURE: 128 MMHG | HEIGHT: 66 IN | OXYGEN SATURATION: 98 % | DIASTOLIC BLOOD PRESSURE: 77 MMHG

## 2022-07-26 PROBLEM — H53.8 BLURRED VISION: Status: ACTIVE | Noted: 2022-07-26

## 2022-07-26 PROBLEM — H53.8 BLURRED VISION: Status: RESOLVED | Noted: 2022-07-26 | Resolved: 2022-07-26

## 2022-07-26 LAB
GLUCOSE BLD STRIP.AUTO-MCNC: 109 MG/DL (ref 70–110)
GLUCOSE BLD STRIP.AUTO-MCNC: 72 MG/DL (ref 70–110)
GLUCOSE BLD STRIP.AUTO-MCNC: 75 MG/DL (ref 70–110)
GLUCOSE BLD STRIP.AUTO-MCNC: 78 MG/DL (ref 70–110)
GLUCOSE BLD STRIP.AUTO-MCNC: 88 MG/DL (ref 70–110)

## 2022-07-26 PROCEDURE — 2709999900 HC NON-CHARGEABLE SUPPLY: Performed by: OPHTHALMOLOGY

## 2022-07-26 PROCEDURE — 77030013389: Performed by: OPHTHALMOLOGY

## 2022-07-26 PROCEDURE — V2632 POST CHMBR INTRAOCULAR LENS: HCPCS | Performed by: OPHTHALMOLOGY

## 2022-07-26 PROCEDURE — 74011250636 HC RX REV CODE- 250/636: Performed by: OPHTHALMOLOGY

## 2022-07-26 PROCEDURE — 82962 GLUCOSE BLOOD TEST: CPT

## 2022-07-26 PROCEDURE — 76210000026 HC REC RM PH II 1 TO 1.5 HR: Performed by: OPHTHALMOLOGY

## 2022-07-26 PROCEDURE — 74011250637 HC RX REV CODE- 250/637: Performed by: ANESTHESIOLOGY

## 2022-07-26 PROCEDURE — 76010000138 HC OR TIME 0.5 TO 1 HR: Performed by: OPHTHALMOLOGY

## 2022-07-26 PROCEDURE — 77030018837 HC SOL IRR OPTH ALCN -A: Performed by: OPHTHALMOLOGY

## 2022-07-26 PROCEDURE — 77030006704 HC BLD OPHTH SLT ALCN -B: Performed by: OPHTHALMOLOGY

## 2022-07-26 PROCEDURE — 74011250636 HC RX REV CODE- 250/636: Performed by: NURSE ANESTHETIST, CERTIFIED REGISTERED

## 2022-07-26 PROCEDURE — 74011000250 HC RX REV CODE- 250: Performed by: OPHTHALMOLOGY

## 2022-07-26 PROCEDURE — 76060000032 HC ANESTHESIA 0.5 TO 1 HR: Performed by: OPHTHALMOLOGY

## 2022-07-26 PROCEDURE — 77030020782 HC GWN BAIR PAWS FLX 3M -B: Performed by: OPHTHALMOLOGY

## 2022-07-26 PROCEDURE — 77030013340: Performed by: OPHTHALMOLOGY

## 2022-07-26 DEVICE — ACRYSOF(R) IQ ASPHERIC NATURAL IOL, SINGLE-PIECE ACRYLIC FOLDABLE PCL, UV WITH BLUE LIGHTFILTER, 13.0MM LENGTH, 6.0MM ANTERIORASYMMETRIC BICONVEX OPTIC, PLANAR HAPTICS.
Type: IMPLANTABLE DEVICE | Site: EYE | Status: FUNCTIONAL
Brand: ACRYSOF®

## 2022-07-26 RX ORDER — PROCHLORPERAZINE EDISYLATE 5 MG/ML
5 INJECTION INTRAMUSCULAR; INTRAVENOUS ONCE
Status: CANCELLED | OUTPATIENT
Start: 2022-07-26 | End: 2022-07-26

## 2022-07-26 RX ORDER — PHENYLEPHRINE HYDROCHLORIDE 25 MG/ML
1 SOLUTION/ DROPS OPHTHALMIC
Status: COMPLETED | OUTPATIENT
Start: 2022-07-26 | End: 2022-07-26

## 2022-07-26 RX ORDER — KETOROLAC TROMETHAMINE 5 MG/ML
1 SOLUTION OPHTHALMIC
Status: COMPLETED | OUTPATIENT
Start: 2022-07-26 | End: 2022-07-26

## 2022-07-26 RX ORDER — ACETAMINOPHEN 325 MG/1
650 TABLET ORAL ONCE
Status: COMPLETED | OUTPATIENT
Start: 2022-07-26 | End: 2022-07-26

## 2022-07-26 RX ORDER — ALBUTEROL SULFATE 0.83 MG/ML
2.5 SOLUTION RESPIRATORY (INHALATION)
Status: CANCELLED | OUTPATIENT
Start: 2022-07-26 | End: 2022-07-27

## 2022-07-26 RX ORDER — SODIUM CHLORIDE 0.9 % (FLUSH) 0.9 %
5-40 SYRINGE (ML) INJECTION EVERY 8 HOURS
Status: CANCELLED | OUTPATIENT
Start: 2022-07-26

## 2022-07-26 RX ORDER — HYDROMORPHONE HYDROCHLORIDE 1 MG/ML
0.5 INJECTION, SOLUTION INTRAMUSCULAR; INTRAVENOUS; SUBCUTANEOUS
Status: CANCELLED | OUTPATIENT
Start: 2022-07-26

## 2022-07-26 RX ORDER — EPINEPHRINE 1 MG/ML
INJECTION, SOLUTION, CONCENTRATE INTRAVENOUS AS NEEDED
Status: DISCONTINUED | OUTPATIENT
Start: 2022-07-26 | End: 2022-07-26 | Stop reason: HOSPADM

## 2022-07-26 RX ORDER — FENTANYL CITRATE 50 UG/ML
INJECTION, SOLUTION INTRAMUSCULAR; INTRAVENOUS AS NEEDED
Status: DISCONTINUED | OUTPATIENT
Start: 2022-07-26 | End: 2022-07-26 | Stop reason: HOSPADM

## 2022-07-26 RX ORDER — MIDAZOLAM HYDROCHLORIDE 1 MG/ML
INJECTION, SOLUTION INTRAMUSCULAR; INTRAVENOUS AS NEEDED
Status: DISCONTINUED | OUTPATIENT
Start: 2022-07-26 | End: 2022-07-26 | Stop reason: HOSPADM

## 2022-07-26 RX ORDER — TROPICAMIDE 10 MG/ML
1 SOLUTION/ DROPS OPHTHALMIC
Status: COMPLETED | OUTPATIENT
Start: 2022-07-26 | End: 2022-07-26

## 2022-07-26 RX ORDER — SODIUM CHLORIDE 0.9 % (FLUSH) 0.9 %
5-40 SYRINGE (ML) INJECTION AS NEEDED
Status: CANCELLED | OUTPATIENT
Start: 2022-07-26

## 2022-07-26 RX ORDER — OFLOXACIN 3 MG/ML
1 SOLUTION/ DROPS OPHTHALMIC ONCE
Status: DISCONTINUED | OUTPATIENT
Start: 2022-07-26 | End: 2022-07-26 | Stop reason: HOSPADM

## 2022-07-26 RX ORDER — NALOXONE HYDROCHLORIDE 0.4 MG/ML
0.1 INJECTION, SOLUTION INTRAMUSCULAR; INTRAVENOUS; SUBCUTANEOUS AS NEEDED
Status: CANCELLED | OUTPATIENT
Start: 2022-07-26

## 2022-07-26 RX ORDER — GLUCOSAMINE SULFATE 1500 MG
POWDER IN PACKET (EA) ORAL DAILY
COMMUNITY

## 2022-07-26 RX ORDER — DIPHENHYDRAMINE HYDROCHLORIDE 50 MG/ML
12.5 INJECTION, SOLUTION INTRAMUSCULAR; INTRAVENOUS
Status: CANCELLED | OUTPATIENT
Start: 2022-07-26

## 2022-07-26 RX ORDER — FENTANYL CITRATE 50 UG/ML
25 INJECTION, SOLUTION INTRAMUSCULAR; INTRAVENOUS AS NEEDED
Status: CANCELLED | OUTPATIENT
Start: 2022-07-26

## 2022-07-26 RX ORDER — SODIUM CHLORIDE, SODIUM LACTATE, POTASSIUM CHLORIDE, CALCIUM CHLORIDE 600; 310; 30; 20 MG/100ML; MG/100ML; MG/100ML; MG/100ML
125 INJECTION, SOLUTION INTRAVENOUS CONTINUOUS
Status: DISCONTINUED | OUTPATIENT
Start: 2022-07-26 | End: 2022-07-26 | Stop reason: HOSPADM

## 2022-07-26 RX ORDER — SODIUM CHLORIDE, SODIUM LACTATE, POTASSIUM CHLORIDE, CALCIUM CHLORIDE 600; 310; 30; 20 MG/100ML; MG/100ML; MG/100ML; MG/100ML
100 INJECTION, SOLUTION INTRAVENOUS CONTINUOUS
Status: CANCELLED | OUTPATIENT
Start: 2022-07-26

## 2022-07-26 RX ADMIN — KETOROLAC TROMETHAMINE 1 DROP: 0.5 SOLUTION OPHTHALMIC at 10:53

## 2022-07-26 RX ADMIN — ACETAMINOPHEN 650 MG: 325 TABLET ORAL at 14:56

## 2022-07-26 RX ADMIN — LIDOCAINE HYDROCHLORIDE 2 DROP: 35 GEL OPHTHALMIC at 11:08

## 2022-07-26 RX ADMIN — TROPICAMIDE 1 DROP: 10 SOLUTION/ DROPS OPHTHALMIC at 10:53

## 2022-07-26 RX ADMIN — FENTANYL CITRATE 25 MCG: 50 INJECTION, SOLUTION INTRAMUSCULAR; INTRAVENOUS at 13:20

## 2022-07-26 RX ADMIN — SODIUM CHLORIDE, SODIUM LACTATE, POTASSIUM CHLORIDE, AND CALCIUM CHLORIDE 125 ML/HR: 600; 310; 30; 20 INJECTION, SOLUTION INTRAVENOUS at 11:00

## 2022-07-26 RX ADMIN — PHENYLEPHRINE HYDROCHLORIDE 1 DROP: 25 SOLUTION/ DROPS OPHTHALMIC at 11:03

## 2022-07-26 RX ADMIN — KETOROLAC TROMETHAMINE 1 DROP: 0.5 SOLUTION OPHTHALMIC at 11:03

## 2022-07-26 RX ADMIN — FENTANYL CITRATE 25 MCG: 50 INJECTION, SOLUTION INTRAMUSCULAR; INTRAVENOUS at 13:06

## 2022-07-26 RX ADMIN — MIDAZOLAM 1 MG: 1 INJECTION INTRAMUSCULAR; INTRAVENOUS at 12:56

## 2022-07-26 RX ADMIN — PHENYLEPHRINE HYDROCHLORIDE 1 DROP: 25 SOLUTION/ DROPS OPHTHALMIC at 11:08

## 2022-07-26 RX ADMIN — FENTANYL CITRATE 25 MCG: 50 INJECTION, SOLUTION INTRAMUSCULAR; INTRAVENOUS at 13:11

## 2022-07-26 RX ADMIN — PHENYLEPHRINE HYDROCHLORIDE 1 DROP: 25 SOLUTION/ DROPS OPHTHALMIC at 10:58

## 2022-07-26 RX ADMIN — FENTANYL CITRATE 25 MCG: 50 INJECTION, SOLUTION INTRAMUSCULAR; INTRAVENOUS at 13:00

## 2022-07-26 RX ADMIN — KETOROLAC TROMETHAMINE 1 DROP: 0.5 SOLUTION OPHTHALMIC at 10:58

## 2022-07-26 RX ADMIN — TROPICAMIDE 1 DROP: 10 SOLUTION/ DROPS OPHTHALMIC at 10:58

## 2022-07-26 RX ADMIN — TROPICAMIDE 1 DROP: 10 SOLUTION/ DROPS OPHTHALMIC at 11:08

## 2022-07-26 RX ADMIN — MIDAZOLAM 0.5 MG: 1 INJECTION INTRAMUSCULAR; INTRAVENOUS at 13:10

## 2022-07-26 RX ADMIN — KETOROLAC TROMETHAMINE 1 DROP: 0.5 SOLUTION OPHTHALMIC at 11:08

## 2022-07-26 RX ADMIN — PHENYLEPHRINE HYDROCHLORIDE 1 DROP: 25 SOLUTION/ DROPS OPHTHALMIC at 10:53

## 2022-07-26 RX ADMIN — MIDAZOLAM 0.5 MG: 1 INJECTION INTRAMUSCULAR; INTRAVENOUS at 13:04

## 2022-07-26 RX ADMIN — TROPICAMIDE 1 DROP: 10 SOLUTION/ DROPS OPHTHALMIC at 11:03

## 2022-07-26 NOTE — ANESTHESIA POSTPROCEDURE EVALUATION
Procedure(s):  CATARACT EXTRACTION WITH INTRA OCULAR LENS IMPLANT- LEFT EYE.     MAC    Anesthesia Post Evaluation      Multimodal analgesia: multimodal analgesia used between 6 hours prior to anesthesia start to PACU discharge  Patient location during evaluation: PACU  Patient participation: complete - patient participated  Level of consciousness: awake and alert  Pain management: inadequate  Airway patency: patent  Anesthetic complications: no  Cardiovascular status: acceptable  Respiratory status: acceptable  Hydration status: acceptable  Post anesthesia nausea and vomiting:  none  Final Post Anesthesia Temperature Assessment:  Normothermia (36.0-37.5 degrees C)      INITIAL Post-op Vital signs:   Vitals Value Taken Time   /67 07/26/22 1329   Temp     Pulse 66 07/26/22 1329   Resp 15 07/26/22 1329   SpO2 100 % 07/26/22 1329

## 2022-07-26 NOTE — ANESTHESIA PREPROCEDURE EVALUATION
Relevant Problems   No relevant active problems       Anesthetic History   No history of anesthetic complications            Review of Systems / Medical History  Patient summary reviewed, nursing notes reviewed and pertinent labs reviewed    Pulmonary            Asthma : well controlled  Pertinent negatives: No sleep apnea and smoker     Neuro/Psych   Within defined limits           Cardiovascular    Hypertension: well controlled              Exercise tolerance: >4 METS  Comments: H/o vsd repair at 25, no problems since     GI/Hepatic/Renal                Endo/Other    Diabetes: poorly controlled, type 2  Hypothyroidism  Arthritis     Other Findings              Physical Exam    Airway  Mallampati: II  TM Distance: > 6 cm  Neck ROM: normal range of motion   Mouth opening: Normal     Cardiovascular    Rhythm: regular  Rate: normal         Dental  No notable dental hx       Pulmonary  Breath sounds clear to auscultation               Abdominal  GI exam deferred       Other Findings            Anesthetic Plan    ASA: 3  Anesthesia type: MAC          Induction: Intravenous  Anesthetic plan and risks discussed with: Patient

## 2022-07-26 NOTE — OP NOTES
Cataract Operative Note      Patient: Fabian Jessica               Sex: female          DOA: 7/26/2022         YOB: 1950      Age:  81 y.o.        LOS:  LOS: 0 days     Preoperative Diagnosis: Cataract right eye    Postoperative Diagnosis:  Cataract  right eye  Surgeon: Sveta Dang MD, M.D. Anesthesia:  Topical anesthesia  Procedure:  Phacoemulsification of posterior chamber for intraocular lens implantation right    Fluids:  0    Procedure in Detail: The operative eye was prepped and draped in the usual fashion. A lid speculum was placed in the operative eye. A clear cornea approach was utilized. A paracentesis incision(s) was constructed with a 1 mm slit knife. One percent preservative-free lidocaine followed by viscoelastic was instilled into the anterior chamber. A clear corneal incision was made with a slit knife. A continuous curvilinear capsulorrhexis was constructed followed by hydrodissection. A phacoemulsification tip was placed into the eye, and the lens nucleus was emulsified. The irrigation/aspiration device was then used to remove any remaining cortical material.  Polishing of the capsule was performed as needed. The intraocular lens was then placed into the capsular bag after it was re-inflated with viscoelastic. The remaining viscoelastic was then removed using the irrigation/aspiration device. BSS on a cannula was then used to hydrate the wound edges. At the end of the procedure the wound was found to be watertight, the anterior chamber was deep and the pupil round. No blood loss during surgery. An antibiotic and anti-inflammatroy was placed into the operative eye. The lid speculum was removed. Protective sunglasses were then placed onto the patient. The patient was taken to the [address] [place] [address] Unit (PACU) in good condition having tolerated the procedure well. Estimated Blood Loss: 0                 Implants:   Implant Name Type Inv.  Item Serial No.  Lot No. LRB No. Used Action   LENS INTOCU 6.0 TO 30.0 DIOPT 118.7 A CONSTANT 0DEG ANG - [id number] 002 Intraocular Lens LENS INTOCU 6.0 TO 30.0 DIOPT 118.7 A CONSTANT 0DEG ANG [id number] 002 [employer]  Left 1 Implanted       Specimens: * No specimens in log *            Complications:  None           Andrews Gallardo MD [facility].  [unfilled]  1:31 PM

## 2022-07-26 NOTE — PERIOP NOTES
Reviewed PTA medication list with patient/caregiver and patient/caregiver denies any additional medications. Patient admits to having a responsible adult care for them at home for at least 24 hours after surgery. Patient encouraged to use gown warming system and informed that using said warming gown to regulate body temperature prior to a procedure has been shown to help reduce the risks of blood clots and infection. Patient's pharmacy of choice verified and documented in PTA medication section. Dual skin assessment & fall risk band verification completed with Cyrus Roberts RN.

## 2022-07-26 NOTE — DISCHARGE INSTRUCTIONS
Cataract Surgery: What to Expect at [address] [place] [address] Drive had cataract surgery. It replaced your cloudy natural lens with a clear artificial one. After surgery, your eye may feel scratchy, sticky, or uncomfortable. It may also water more than usual.  Most people see better 1 to 3 days after surgery. But it could take 3 to 10 weeks to get the full benefits of surgery and to see as clearly as possible. Your doctor may send you home with a bandage, patch, or clear shield on your eye. This will keep you from rubbing your eye. Your doctor will also give you eyedrops to help your eye heal. Use them exactly as directed. You can read or watch TV right away, but things may look blurry. Most people are able to return to work or their normal routine in 1 to 3 days. After your eye heals, you may still need to wear glasses, especially for reading. This care sheet gives you a general idea about how long it will take for you to recover. But each person recovers at a different pace. Follow the steps below to get better as quickly as possible. How can you care for yourself at home? Activity    Rest when you feel tired. Getting enough sleep will help you recover. You may have trouble judging distances for a few days. Move slowly, and be careful going up and down stairs and pouring hot liquids. Ask for help if you need it. Ask your doctor when it is okay to drive. Wear your eye bandage, patch, or shield for as long as your doctor recommends. You may only need to wear it when you sleep. You can shower or wash your hair the day after surgery. Keep water, soap, shampoo, hair spray, and shaving lotion out of your eye, especially for the first week. Do not rub or put pressure on your eye for at least 1 week. Do not wear eye makeup for 1 to 2 weeks. You may also want to avoid face cream or lotion. Do not get your hair colored or permed for 10 days after surgery.      Do not bend over or do any strenuous activities, such as biking, jogging, weight lifting, or aerobic exercise, for 2 weeks or until your doctor says it is okay. Avoid swimming, hot tubs, gardening, and dusting for 1 to 2 weeks. Wear sunglasses on bright days for at least 1 year after surgery. Medicines    Your doctor will tell you if and when you can restart your medicines. You will also be given instructions about taking any new medicines. If you take aspirin or some other blood thinner, ask your doctor if and when to start taking it again. Make sure that you understand exactly what your doctor wants you to do. Follow your doctor's instructions for when to use your eyedrops. Always wash your hands before you put your drops in. To put in eyedrops:  Tilt your head back, and pull your lower eyelid down with one finger. Drop or squirt the medicine inside the lower lid. Close your eye for 30 to 60 seconds to let the drops or ointment move around. Do not touch the ointment or dropper tip to your eyelashes or any other surface. Follow your doctor's instructions for taking pain medicines. Follow-up care is a key part of your treatment and safety. Be sure to make and go to all appointments, and call your doctor if you are having problems. It's also a good idea to know your test results and keep a list of the medicines you take. When should you call for help? Call 911 anytime you think you may need emergency care. For example, call if:    You passed out (lost consciousness). You have a sudden loss of vision. You have sudden chest pain, are short of breath, or cough up blood. Call your doctor now or seek immediate medical care if:    You have signs of an eye infection, such as:  Pus or thick discharge coming from the eye. Redness or swelling around the eye. A fever. You have new or worse eye pain. You have vision changes.      You have symptoms of a blood clot in your leg (called a deep vein thrombosis), such as:  Pain in the calf, back of the knee, thigh, or groin. Redness and swelling in your leg or groin. Watch closely for changes in your health, and be sure to contact your doctor if:    You do not get better as expected. Where can you learn more? Go to http://www.gray.com/  Enter R255 in the search box to learn more about \"Cataract Surgery: What to Expect at Home. \"  Current as of: January 24, 2022               Content Version: 13.2  © 2006-2022 [employer]. Care instructions adapted under license by [employer] (which disclaims liability or warranty for this information). If you have questions about a medical condition or this instruction, always ask your healthcare professional. [address] any warranty or liability for your use of this information.

## 2022-07-26 NOTE — INTERVAL H&P NOTE
Update History & Physical    The Patient's History and Physical of July 26, 2022 was reviewed with the patient and I examined the patient. There was no change. The surgical site was confirmed by the patient and me. Plan:  The risk, benefits, expected outcome, and alternative to the recommended procedure have been discussed with the patient. Patient understands and wants to proceed with the procedure.     Electronically signed by Mayo Weiss MD on 7/26/2022 at 10:29 AM

## 2022-07-26 NOTE — PERIOP NOTES
Declined - daughter at  beside Hypoglycemic-BS 75asymptomatic- alert , oriented- OJ given with crackers/peanut butter- recheck after 15 minutes- 80  Will continue to monitor-  Face to face  Discharged  instruction given to Alejandro/pt and agreed with the plan. Discharged includes diet, activity limitations , medication to continue and f/u appointment. Will D/c to home in stable condition with care of family.   C/o h/a - PRN TYLENOL GIVEN 650 MG -

## (undated) DEVICE — SOLUTION IRRIGATION BAL SALT SOLUTION 500 ML STRL BSS

## (undated) DEVICE — 3M™ TEGADERM™ TRANSPARENT FILM DRESSING FRAME STYLE, 1624W, 2-3/8 IN X 2-3/4 IN (6 CM X 7 CM), 100/CT 4CT/CASE: Brand: 3M™ TEGADERM™

## (undated) DEVICE — Device

## (undated) DEVICE — CANN VISCOFLOW FRM 27G 22MM --

## (undated) DEVICE — MICROSURGICAL INSTRUMENT HYDRODISSECTION CANNULA 27GA, 1.57MM BEND (AKAHOSHI STYLE): Brand: ALCON

## (undated) DEVICE — STRAP,POSITIONING,KNEE/BODY,FOAM,4X60": Brand: MEDLINE

## (undated) DEVICE — SOLUTION IRRIG 1000ML H2O STRL BLT

## (undated) DEVICE — SATINSLIT® KNIFE 2.75MM ANGLED: Brand: SATINSLIT®